# Patient Record
Sex: MALE | Race: WHITE | Employment: STUDENT | URBAN - METROPOLITAN AREA
[De-identification: names, ages, dates, MRNs, and addresses within clinical notes are randomized per-mention and may not be internally consistent; named-entity substitution may affect disease eponyms.]

---
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
## 2022-06-08 ENCOUNTER — APPOINTMENT (OUTPATIENT)
Dept: GENERAL RADIOLOGY | Age: 22
End: 2022-06-08
Payer: COMMERCIAL

## 2022-06-08 ENCOUNTER — HOSPITAL ENCOUNTER (EMERGENCY)
Age: 22
Discharge: HOME | End: 2022-06-08
Attending: STUDENT IN AN ORGANIZED HEALTH CARE EDUCATION/TRAINING PROGRAM
Payer: COMMERCIAL

## 2022-06-08 ENCOUNTER — APPOINTMENT (OUTPATIENT)
Dept: CT IMAGING | Age: 22
End: 2022-06-08
Payer: COMMERCIAL

## 2022-06-08 VITALS
BODY MASS INDEX: 22.9 KG/M2 | WEIGHT: 160 LBS | RESPIRATION RATE: 14 BRPM | TEMPERATURE: 96.8 F | OXYGEN SATURATION: 100 % | DIASTOLIC BLOOD PRESSURE: 66 MMHG | HEART RATE: 62 BPM | SYSTOLIC BLOOD PRESSURE: 128 MMHG | HEIGHT: 70 IN

## 2022-06-08 DIAGNOSIS — R42 ORTHOSTATIC LIGHTHEADEDNESS: Primary | ICD-10-CM

## 2022-06-08 LAB
ALBUMIN SERPL-MCNC: 4.7 G/DL (ref 3.5–5.2)
ALP BLD-CCNC: 82 U/L (ref 40–129)
ALT SERPL-CCNC: 24 U/L (ref 0–40)
ANION GAP SERPL CALCULATED.3IONS-SCNC: 12 MMOL/L (ref 7–16)
AST SERPL-CCNC: 21 U/L (ref 0–39)
BACTERIA: NORMAL /HPF
BASOPHILS ABSOLUTE: 0.01 E9/L (ref 0–0.2)
BASOPHILS RELATIVE PERCENT: 0.2 % (ref 0–2)
BILIRUB SERPL-MCNC: 0.4 MG/DL (ref 0–1.2)
BILIRUBIN URINE: NEGATIVE
BLOOD, URINE: NEGATIVE
BUN BLDV-MCNC: 11 MG/DL (ref 6–20)
CALCIUM SERPL-MCNC: 9.9 MG/DL (ref 8.6–10.2)
CHLORIDE BLD-SCNC: 102 MMOL/L (ref 98–107)
CLARITY: CLEAR
CO2: 24 MMOL/L (ref 22–29)
COLOR: YELLOW
CREAT SERPL-MCNC: 0.8 MG/DL (ref 0.7–1.2)
D DIMER: 239 NG/ML DDU
EKG ATRIAL RATE: 67 BPM
EKG P AXIS: 58 DEGREES
EKG P-R INTERVAL: 150 MS
EKG Q-T INTERVAL: 366 MS
EKG QRS DURATION: 100 MS
EKG QTC CALCULATION (BAZETT): 386 MS
EKG R AXIS: -34 DEGREES
EKG T AXIS: 23 DEGREES
EKG VENTRICULAR RATE: 67 BPM
EOSINOPHILS ABSOLUTE: 0.03 E9/L (ref 0.05–0.5)
EOSINOPHILS RELATIVE PERCENT: 0.6 % (ref 0–6)
GFR AFRICAN AMERICAN: >60
GFR NON-AFRICAN AMERICAN: >60 ML/MIN/1.73
GLUCOSE BLD-MCNC: 96 MG/DL (ref 74–99)
GLUCOSE URINE: NEGATIVE MG/DL
HCT VFR BLD CALC: 45.9 % (ref 37–54)
HEMOGLOBIN: 16.1 G/DL (ref 12.5–16.5)
IMMATURE GRANULOCYTES #: 0.02 E9/L
IMMATURE GRANULOCYTES %: 0.4 % (ref 0–5)
KETONES, URINE: NEGATIVE MG/DL
LEUKOCYTE ESTERASE, URINE: NEGATIVE
LYMPHOCYTES ABSOLUTE: 1.37 E9/L (ref 1.5–4)
LYMPHOCYTES RELATIVE PERCENT: 25.1 % (ref 20–42)
MCH RBC QN AUTO: 30.4 PG (ref 26–35)
MCHC RBC AUTO-ENTMCNC: 35.1 % (ref 32–34.5)
MCV RBC AUTO: 86.8 FL (ref 80–99.9)
MONOCYTES ABSOLUTE: 0.53 E9/L (ref 0.1–0.95)
MONOCYTES RELATIVE PERCENT: 9.7 % (ref 2–12)
NEUTROPHILS ABSOLUTE: 3.49 E9/L (ref 1.8–7.3)
NEUTROPHILS RELATIVE PERCENT: 64 % (ref 43–80)
NITRITE, URINE: NEGATIVE
PDW BLD-RTO: 12.7 FL (ref 11.5–15)
PH UA: 7.5 (ref 5–9)
PLATELET # BLD: 193 E9/L (ref 130–450)
PMV BLD AUTO: 9.9 FL (ref 7–12)
POTASSIUM REFLEX MAGNESIUM: 4.1 MMOL/L (ref 3.5–5)
PROTEIN UA: NEGATIVE MG/DL
RBC # BLD: 5.29 E12/L (ref 3.8–5.8)
RBC UA: NORMAL /HPF (ref 0–2)
SODIUM BLD-SCNC: 138 MMOL/L (ref 132–146)
SPECIFIC GRAVITY UA: 1.01 (ref 1–1.03)
TOTAL PROTEIN: 7.8 G/DL (ref 6.4–8.3)
UROBILINOGEN, URINE: 0.2 E.U./DL
WBC # BLD: 5.5 E9/L (ref 4.5–11.5)
WBC UA: NORMAL /HPF (ref 0–5)

## 2022-06-08 PROCEDURE — 85025 COMPLETE CBC W/AUTO DIFF WBC: CPT

## 2022-06-08 PROCEDURE — 2580000003 HC RX 258: Performed by: STUDENT IN AN ORGANIZED HEALTH CARE EDUCATION/TRAINING PROGRAM

## 2022-06-08 PROCEDURE — 81001 URINALYSIS AUTO W/SCOPE: CPT

## 2022-06-08 PROCEDURE — 85378 FIBRIN DEGRADE SEMIQUANT: CPT

## 2022-06-08 PROCEDURE — 93005 ELECTROCARDIOGRAM TRACING: CPT | Performed by: STUDENT IN AN ORGANIZED HEALTH CARE EDUCATION/TRAINING PROGRAM

## 2022-06-08 PROCEDURE — 80053 COMPREHEN METABOLIC PANEL: CPT

## 2022-06-08 PROCEDURE — 71275 CT ANGIOGRAPHY CHEST: CPT

## 2022-06-08 PROCEDURE — 71046 X-RAY EXAM CHEST 2 VIEWS: CPT

## 2022-06-08 PROCEDURE — 99285 EMERGENCY DEPT VISIT HI MDM: CPT

## 2022-06-08 PROCEDURE — 6360000004 HC RX CONTRAST MEDICATION: Performed by: RADIOLOGY

## 2022-06-08 RX ORDER — 0.9 % SODIUM CHLORIDE 0.9 %
1000 INTRAVENOUS SOLUTION INTRAVENOUS ONCE
Status: COMPLETED | OUTPATIENT
Start: 2022-06-08 | End: 2022-06-08

## 2022-06-08 RX ADMIN — IOPAMIDOL 75 ML: 755 INJECTION, SOLUTION INTRAVENOUS at 16:16

## 2022-06-08 RX ADMIN — SODIUM CHLORIDE 1000 ML: 9 INJECTION, SOLUTION INTRAVENOUS at 14:29

## 2022-06-08 ASSESSMENT — ENCOUNTER SYMPTOMS
SHORTNESS OF BREATH: 0
NAUSEA: 0
ABDOMINAL PAIN: 0
BACK PAIN: 0
SORE THROAT: 0
RHINORRHEA: 0
COUGH: 0
DIARRHEA: 0
VOMITING: 0

## 2022-06-08 ASSESSMENT — PAIN - FUNCTIONAL ASSESSMENT
PAIN_FUNCTIONAL_ASSESSMENT: NONE - DENIES PAIN
PAIN_FUNCTIONAL_ASSESSMENT: NONE - DENIES PAIN

## 2022-06-08 NOTE — ED PROVIDER NOTES
[facility]   Department of Emergency Medicine     Written by: Susana Junior DO  Patient Name: Ami Fabian  Attending Provider: Agnie Austin DO  Admit Date: 2022 12:40 PM  MRN: [id number]    : 2000        Chief Complaint   Patient presents with   [place] Other     \"whenever I sit still, I feel like I'm going to faint\" since yesterday, sweating x2 weeks    - Chief complaint    HPI   Ami Fabian is a 25 y.o. male presenting to the ED for evaluation of Other (\"whenever I sit still, I feel like I'm going to faint\" since yesterday, sweating x2 weeks)    Patient is a 26 y/o male with pmhx of ADHD, autism spectrum disorder, and OCD, presenting for evaluation of feeling like he is going to pass out, as well as diaphoresis for the past two weeks. States that when he has blood drawn in the past he feels this same sensation; no recent blood work or doctor visits. States while playing video games yesterday, seated, he suddenly had the sensation of lightheadedness like he might pass out. It lasted a few seconds. Then later while laying down he had the feeling again and states he is currently feeling it now. Also states he feels like sometime he realizes he forgot to breath and has to think about breathing normally. Denies SOB, CP, or palpitations. Denies any recent illnesses, fevers, N/V/D, abdominal pain, abnormal urinary symptoms, rashes anywhere, joint pains, confusion or syncopal event. Denies any motor or sensory deficits, headaches or visual changes. He did not fall or injure himself. Denies AH/VH, SI/HI. Denies any recent medication changes. Review of Systems   Constitutional: Positive for diaphoresis. Negative for chills and fever. HENT: Negative for rhinorrhea and sore throat. Eyes: Negative for visual disturbance. Respiratory: Negative for cough and shortness of breath. Cardiovascular: Negative for chest pain and palpitations.    Gastrointestinal: Negative for abdominal pain, diarrhea, nausea and vomiting. Genitourinary: Negative for dysuria and frequency. Musculoskeletal: Negative for back pain and myalgias. Skin: Negative for rash and wound. Neurological: Positive for light-headedness. Negative for weakness and headaches. Psychiatric/Behavioral: Negative for confusion. All other systems reviewed and are negative. Physical Exam  Vitals and nursing note reviewed. Constitutional:       General: He is not in acute distress. Appearance: He is not toxic-appearing. HENT:      Head: Normocephalic and atraumatic. Right Ear: External ear normal.      Left Ear: External ear normal.      Nose: Nose normal. No rhinorrhea. Mouth/Throat:      Mouth: Mucous membranes are moist.      Pharynx: Oropharynx is clear. Eyes:      Extraocular Movements: Extraocular movements intact. Conjunctiva/sclera: Conjunctivae normal.      Pupils: Pupils are equal, round, and reactive to light. Cardiovascular:      Rate and Rhythm: Normal rate and regular rhythm. Pulses: Normal pulses. Heart sounds: Normal heart sounds. No murmur heard. Pulmonary:      Effort: Pulmonary effort is normal. No respiratory distress. Breath sounds: Normal breath sounds. No wheezing or rales. Abdominal:      General: Bowel sounds are normal.      Palpations: Abdomen is soft. Tenderness: There is no abdominal tenderness. There is no guarding or rebound. Musculoskeletal:         General: No tenderness. Normal range of motion. Cervical back: Normal range of motion and neck supple. No rigidity or tenderness. Right lower leg: No edema. Left lower leg: No edema. Skin:     General: Skin is warm and dry. Capillary Refill: Capillary refill takes less than 2 seconds. Coloration: Skin is not jaundiced or pale. Findings: No rash. Neurological:      General: No focal deficit present.       Mental Status: He is alert and oriented to person, place, and time. Sensory: No sensory deficit. Motor: No weakness. Psychiatric:         Mood and Affect: Mood normal.         Behavior: Behavior normal.          Procedures       [facility]    This is a 25 y.o. male presenting for evaluation of vague symptoms of lightheadedness and feeling like he might pass out; please see HPI for further details. On arrival patient is alert, oriented, no acute distress; he is nontoxic in appearance. Vitals are stable; orthostatic vital signs are notable for increase in heart rate from 60 to 88 bpm from sitting to standing. Exam is overall generally unremarkable; lungs CTA bilaterally, no focal neurologic deficits noted, heart RRR, no murmurs, abdomen soft and nontender. Labs and imaging ordered. Patient seen and examined. Labs reviewed, we did obtain a D-dimer given that patient was having vague symptoms of near syncope and he did have COVID around [place] time; D-dimer was 239; follow-up CTA did not show any acute abnormalities and no evidence for acute pulmonary embolism. Patient was treated with IV fluid bolus with improvement of symptoms. Suspect possibly orthostatic lightheadedness. Feel he is stable and appropriate for discharge. He ambulated in the part without issue. Results and plan discussed with the patient and his mother, they voiced understanding and are amenable. Return precautions were discussed. EKG reviewed and interpreted by me:  Normal sinus rhythm, no ST elevations; LAD, no previous EKG available for comparison. Rate 67, , , QTc 386. I have discussed this patient with my attending, who has seen the patient and agrees with this disposition. Patient was seen and evaluated by myself and my attending Robert Buckley DO.  Assessment and Plan discussed with attending provider, please see attestation for final plan of care.       --------------------------------------------- PAST HISTORY ---------------------------------------------  Past Medical History:  has a past medical history of ADHD (attention deficit hyperactivity disorder), Autistic spectrum disorder, Depression, and OCD (obsessive compulsive disorder). Past Surgical History:  has no past surgical history on file. Social History:  reports that he has never smoked. He has never used smokeless tobacco. He reports that he does not drink alcohol and does not use drugs. Family History: family history includes No Known Problems in his father and mother; Other in his brother. The patients home medications have been reviewed.     Allergies: Seasonal    -------------------------------------------------- RESULTS -------------------------------------------------  Labs:  Results for orders placed or performed during the hospital encounter of 06/08/22   CBC with Auto Differential   Result Value Ref Range    WBC 5.5 4.5 - 11.5 E9/L    RBC 5.29 3.80 - 5.80 E12/L    Hemoglobin 16.1 12.5 - 16.5 g/dL    Hematocrit 45.9 37.0 - 54.0 %    MCV 86.8 80.0 - 99.9 fL    MCH 30.4 26.0 - 35.0 pg    MCHC 35.1 (H) 32.0 - 34.5 %    RDW 12.7 11.5 - 15.0 fL    Platelets 873 460 - 361 E9/L    MPV 9.9 7.0 - 12.0 fL    Neutrophils % 64.0 43.0 - 80.0 %    Immature Granulocytes % 0.4 0.0 - 5.0 %    Lymphocytes % 25.1 20.0 - 42.0 %    Monocytes % 9.7 2.0 - 12.0 %    Eosinophils % 0.6 0.0 - 6.0 %    Basophils % 0.2 0.0 - 2.0 %    Neutrophils Absolute 3.49 1.80 - 7.30 E9/L    Immature Granulocytes # 0.02 E9/L    Lymphocytes Absolute 1.37 (L) 1.50 - 4.00 E9/L    Monocytes Absolute 0.53 0.10 - 0.95 E9/L    Eosinophils Absolute 0.03 (L) 0.05 - 0.50 E9/L    Basophils Absolute 0.01 0.00 - 0.20 E9/L   Comprehensive Metabolic Panel w/ Reflex to MG   Result Value Ref Range    Sodium 138 132 - 146 mmol/L    Potassium reflex Magnesium 4.1 3.5 - 5.0 mmol/L    Chloride 102 98 - 107 mmol/L    CO2 24 22 - 29 mmol/L    Anion Gap 12 7 - 16 mmol/L    Glucose 96 74 - 99 mg/dL    BUN 11 6 - 20 mg/dL    CREATININE 0.8 0.7 - 1.2 mg/dL    GFR Non-African American >60 >=60 mL/min/1.73    GFR African American >60     Calcium 9.9 8.6 - 10.2 mg/dL    Total Protein 7.8 6.4 - 8.3 g/dL    Albumin 4.7 3.5 - 5.2 g/dL    Total Bilirubin 0.4 0.0 - 1.2 mg/dL    Alkaline Phosphatase 82 40 - 129 U/L    ALT 24 0 - 40 U/L    AST 21 0 - 39 U/L   Urinalysis with Microscopic   Result Value Ref Range    Color, UA Yellow Straw/Yellow    Clarity, UA Clear Clear    Glucose, Ur Negative Negative mg/dL    Bilirubin Urine Negative Negative    Ketones, Urine Negative Negative mg/dL    Specific Gravity, UA 1.010 1.005 - 1.030    Blood, Urine Negative Negative    pH, UA 7.5 5.0 - 9.0    Protein, UA Negative Negative mg/dL    Urobilinogen, Urine 0.2 <2.0 E.U./dL    Nitrite, Urine Negative Negative    Leukocyte Esterase, Urine Negative Negative    WBC, UA NONE 0 - 5 /HPF    RBC, UA NONE 0 - 2 /HPF    Bacteria, UA NONE SEEN None Seen /HPF   D-Dimer, Quantitative   Result Value Ref Range    D-Dimer, Quant 239 ng/mL DDU   EKG 12 Lead   Result Value Ref Range    Ventricular Rate 67 BPM    Atrial Rate 67 BPM    P-R Interval 150 ms    QRS Duration 100 ms    Q-T Interval 366 ms    QTc Calculation (Bazett) 386 ms    P Axis 58 degrees    R Axis -34 degrees    T Axis 23 degrees       Radiology:  CTA PULMONARY W CONTRAST   Final Result   No evidence of pulmonary embolism or acute pulmonary abnormality. XR CHEST (2 VW)   Final Result   No acute process. ------------------------- NURSING NOTES AND VITALS REVIEWED ---------------------------  Date / Time Roomed:  6/8/2022 12:40 PM  ED Bed Assignment:  07/07    The nursing notes within the ED encounter and vital signs as below have been reviewed.    /66   Pulse 62   Temp 96.8 °F (36 °C) (Temporal)   Resp 14   Ht 5' 10\" (1.778 m)   Wt 160 lb (72.6 kg)   SpO2 100%   BMI 22.96 kg/m²   Oxygen Saturation Interpretation: Normal      ------------------------------------------ PROGRESS NOTES ------------------------------------------  5:11 PM EDT  I have spoken with the patient and discussed todays results, in addition to providing specific details for the plan of care and counseling regarding the diagnosis and prognosis. Their questions are answered at this time and they are agreeable with the plan. I discussed at length with them reasons for immediate return here for re evaluation. They will followup with their primary care physician by calling their office tomorrow. --------------------------------- ADDITIONAL PROVIDER NOTES ---------------------------------  At this time the patient is without objective evidence of an acute process requiring hospitalization or inpatient management. They have remained hemodynamically stable throughout their entire ED visit and are stable for discharge with outpatient follow-up. The plan has been discussed in detail and they are aware of the specific conditions for emergent return, as well as the importance of follow-up. Discharge Medication List as of 6/8/2022  5:02 PM          Diagnosis:  1. Orthostatic lightheadedness        Disposition:  Patient's disposition: Discharge to home  Patient's condition is stable.        Anny Fair,   Resident  06/08/22 0504

## 2022-06-13 ENCOUNTER — OFFICE VISIT (OUTPATIENT)
Dept: PRIMARY CARE CLINIC | Age: 22
End: 2022-06-13
Payer: COMMERCIAL

## 2022-06-13 VITALS
OXYGEN SATURATION: 98 % | WEIGHT: 164 LBS | DIASTOLIC BLOOD PRESSURE: 62 MMHG | SYSTOLIC BLOOD PRESSURE: 118 MMHG | TEMPERATURE: 97.8 F | HEART RATE: 97 BPM | BODY MASS INDEX: 23.53 KG/M2

## 2022-06-13 DIAGNOSIS — R55 NEAR SYNCOPE: Primary | ICD-10-CM

## 2022-06-13 DIAGNOSIS — F84.0 AUTISTIC SPECTRUM DISORDER: ICD-10-CM

## 2022-06-13 DIAGNOSIS — L70.0 ACNE VULGARIS: ICD-10-CM

## 2022-06-13 DIAGNOSIS — U09.9 POST-COVID SYNDROME: ICD-10-CM

## 2022-06-13 DIAGNOSIS — R53.83 OTHER FATIGUE: ICD-10-CM

## 2022-06-13 DIAGNOSIS — R68.89 HEAT INTOLERANCE: ICD-10-CM

## 2022-06-13 DIAGNOSIS — J02.9 ACUTE PHARYNGITIS, UNSPECIFIED ETIOLOGY: ICD-10-CM

## 2022-06-13 PROCEDURE — 99215 OFFICE O/P EST HI 40 MIN: CPT | Performed by: FAMILY MEDICINE

## 2022-06-13 RX ORDER — ISOTRETINOIN 20 MG/1
CAPSULE ORAL
COMMUNITY
Start: 2022-05-25

## 2022-06-13 RX ORDER — CEFDINIR 300 MG/1
300 CAPSULE ORAL 2 TIMES DAILY
Qty: 20 CAPSULE | Refills: 0 | Status: SHIPPED | OUTPATIENT
Start: 2022-06-13 | End: 2022-06-23

## 2022-06-13 SDOH — ECONOMIC STABILITY: FOOD INSECURITY: WITHIN THE PAST 12 MONTHS, THE FOOD YOU BOUGHT JUST DIDN'T LAST AND YOU DIDN'T HAVE MONEY TO GET MORE.: NEVER TRUE

## 2022-06-13 SDOH — ECONOMIC STABILITY: FOOD INSECURITY: WITHIN THE PAST 12 MONTHS, YOU WORRIED THAT YOUR FOOD WOULD RUN OUT BEFORE YOU GOT MONEY TO BUY MORE.: NEVER TRUE

## 2022-06-13 ASSESSMENT — PATIENT HEALTH QUESTIONNAIRE - PHQ9
SUM OF ALL RESPONSES TO PHQ QUESTIONS 1-9: 0
2. FEELING DOWN, DEPRESSED OR HOPELESS: 0
SUM OF ALL RESPONSES TO PHQ QUESTIONS 1-9: 0
1. LITTLE INTEREST OR PLEASURE IN DOING THINGS: 0
SUM OF ALL RESPONSES TO PHQ QUESTIONS 1-9: 0
SUM OF ALL RESPONSES TO PHQ QUESTIONS 1-9: 0
SUM OF ALL RESPONSES TO PHQ9 QUESTIONS 1 & 2: 0

## 2022-06-13 ASSESSMENT — SOCIAL DETERMINANTS OF HEALTH (SDOH): HOW HARD IS IT FOR YOU TO PAY FOR THE VERY BASICS LIKE FOOD, HOUSING, MEDICAL CARE, AND HEATING?: NOT HARD AT ALL

## 2022-06-13 NOTE — PROGRESS NOTES
diaphoresis,  edema,orthopnea,  palpitations, syncope and near syncopal episode or any exertional symptoms  Resp: Denies cough, hemoptysis, pleuritic pain, SOB, sputum production and wheezing. GI: Denies abdominal pain, change in bowel habits, hematochezia, melena, nausea and vomiting. : Denies urinary symptoms including dysuria , urgency, frequency or hematuria. Musculo: Denies musculoskeletal symptoms. Skin: Denies bruising and rash. Neuro: Denies headache, numbness, stiff neck, tingling and focal weakness slurred speech or facial  droop  Hema/Lymph: Denies bleeding/bruising tendency and enlarged lymph nodes        Current Outpatient Medications:     AMNESTEEM 20 MG chemo capsule, TAKE 1 CAPSULE BY MOUTH TWICE DAILY, Disp: , Rfl:     cefdinir (OMNICEF) 300 MG capsule, Take 1 capsule by mouth 2 times daily for 10 days, Disp: 20 capsule, Rfl: 0    risperiDONE (RISPERDAL) 1 MG tablet, Take 1 tablet by mouth 2 times daily, Disp: , Rfl:     methylphenidate (CONCERTA) 27 MG extended release tablet, Take 27 mg by mouth every morning. Neurology managin, Disp: , Rfl:   Allergies   Allergen Reactions    Seasonal Other (See Comments)     Itchy watery eyes and sneezing       Past Medical History:   Diagnosis Date    ADHD (attention deficit hyperactivity disorder)     Autistic spectrum disorder     Depression     OCD (obsessive compulsive disorder)     Tics     No past surgical history on file.   Family History   Problem Relation Age of Onset    No Known Problems Mother     No Known Problems Father     Other Brother         Burkitt's Lymphoma     Social History     Tobacco Use    Smoking status: Never Smoker    Smokeless tobacco: Never Used   Substance Use Topics    Alcohol use: No    Drug use: No      Social History     Social History Narrative    PMH:    Health Maintenance:    Gardasil - (1/4/2017)    Gardasil - (5/3/2017)    Influenza Vaccination - (11/7/2017)    Medical Problems:    Attention Deficit Hyperactivity Disorder - Autistic Spectrum Disorder. Dr Vernon Samuels psychologist; Kitty Roa    Daniel    Depression    OCD - Tics    Allergies - gets allergy shot. Surgical Hx:    Denies    Reviewed and updated. FH:    Father:    . (Hx)    Mother:    . (Hx)    Denies CM, sudden death, congenital or otherwise    Reviewed, no changes. SH:    . Ely Owusu with Mom (counselor) and Step Dad (dentist); No smokers at home    Plays piano (likes) and previous violin (did not like), guitar, ukalalayli,  Sax     Personal Habits: Cigarette Use: Never Smoked Cigarettes. . (Alcohol)    Reviewed, no changes        Vitals:    06/13/22 1510   BP: 118/62   Pulse: 97   Temp: 97.8 °F (36.6 °C)   SpO2: 98%   Weight: 164 lb (74.4 kg)      Wt Readings from Last 3 Encounters:   06/13/22 164 lb (74.4 kg) (64 %, Z= 0.37)*   06/08/22 160 lb (72.6 kg) (59 %, Z= 0.23)*   11/17/20 166 lb (75.3 kg) (75 %, Z= 0.67)*     * Growth percentiles are based on CDC (Boys, 2-20 Years) data. Physical Exam  Exam:  Const: Appears comfortable. No signs of acute distress present. Head/Face: Atraumatic on inspection. Eyes: EOMI in both eyes. No discharge from the eyes. PERRL. Sclerae clear. ENMT: Auditory canals normal. Tympanic membranes: intact and translucent. External nose WNL. Nasal mucosa is clear. Oropharynx: Slightly erythematous with mild exudate  Neck: Supple. Palpation reveals no adenopathy. No masses appreciated. No JVD. Carotids: no  bruits. Resp: Respirations are unlabored. Clear to auscultation. No rales, rhonchi or wheezes appreciated  over the lungs bilaterally. CV: Rate is regular. Rhythm is regular. No gallop or rubs. No heart murmur appreciated. Extremities: No clubbing, cyanosis, or edema. No calf inflammation or tenderness. Abdomen: Bowel sounds are normoactive. Abdomen is soft, nontender, and nondistended. No  abdominal masses. No palpable hepatosplenomegaly.   Lymph: No palpable or visible regional lymphadenopathy. Musculoskeletal: no acute joint inflammation. Skin: Dry and warm with no rash. Skin normal to inspection and palpation overall. Neuro: Alert and oriented. Affect: appropriate. Upper Extremities: 5/5 bilaterally. Lower Extremities:  5/5 bilaterally. Sensation intact to light touch. Reflexes: DTR's are symmetric and 2+ bilaterally. .  Cranial Nerves: Cranial nerves grossly intact. Office Labs This Visit :  No results found for this visit on 06/13/22. Assessment and Plan:   Diagnosis Orders   1. Near syncope  TSH    T4, Free    Amb External Referral To Cardiology    Carol Murel Green Virus (EBV) Antibody Panel I   2. Acute pharyngitis, unspecified etiology  cefdinir (OMNICEF) 300 MG capsule    ANTISTREPTOLYSIN O TITER   3. Other fatigue  TSH    T4, Free    Carol Barr Virus (EBV) Antibody Panel I   4. Heat intolerance  TSH    T4, Free   5. Post-COVID syndrome     6. Autistic spectrum disorder     7. Acne vulgaris         No problem-specific Assessment & Plan notes found for this encounter. 1. Acute pharyngitis, unspecified etiology  Counseled extensively. Differential reviewed, including serious etiologies. Rule out strep/mono. Check EBV/ASO. Empirically with cefdinir with precautions including C. difficile, risk benefits probiotic reviewed. Consider PANDA  - cefdinir (OMNICEF) 300 MG capsule; Take 1 capsule by mouth 2 times daily for 10 days  Dispense: 20 capsule; Refill: 0  - ANTISTREPTOLYSIN O TITER; Future    2. Near syncope  Counseled extensively. Differential reviewed, including serious etiologies. Interestingly only with lying but yet feels lightheaded not vertiginous. Anxiety/OCD seems to be contributing. However vast array of post-COVID complications reviewed. CTA chest negative, blood work overall negative. Check TFTs. Check ASO/EBV. Refer to cardiology. Role of echo event monitor and stress test reviewed. He is prone to vasovagal syncope.   However not consistent with this at this point. Counseled on proper hydration and salt however. - TSH; Future  - T4, Free; Future  - Amb External Referral To Cardiology  - Satnam Rhymes Virus (EBV) Antibody Panel I; Future    3. Other fatigue  Counseled extensively. Differential reviewed, including serious etiologies. - TSH; Future  - T4, Free; Future  - Carol Barr Virus (EBV) Antibody Panel I; Future    4. Heat intolerance  Counseled extensively. Differential reviewed, including serious etiologies. - TSH; Future  - T4, Free; Future    5. Post-COVID syndrome  Many symptoms of occurred after COVID April 2022. Previously had Moderna x3. Counseled on potential thyroiditis, cardiac complication etc.  However I do feel anxiety/OCD contributing      6. Autistic spectrum disorder  With Asperger syndrome. Follows with [facility]. No recent change in meds prescribed by them including Concerta and Risperdal.  Risk-benefit reviewed. Side effects ADRs reviewed. They are to discuss with Elba Begum and counselor regarding possibility that his ASD, anxiety with OCD tendencies could be contributing. 7. Acne vulgaris  Started on Accutane about 5 months ago. Could be contributing to symptoms. They will discuss with Dr. Elissa Villareal possibility of discontinuing        No flowsheet data found. Plan as above. Counseled extensively and differential diagnoses relevant to above were reviewed, including serious etiologies, risks and complications, especially of left uncontrolled. If relevant, instructions and  alternatives to meds/treatment reviewed, as well as interactions, and  SE's/ADRs reviewed, notify immediately if any, discontinuing new meds if any. Plan made after discussion and shared decision making. Check TFTs EBV and ASO. Mono precautions reviewed. Proper hydration and salt. Falls precautions. Syncope precautions. Start cefdinir. Refer to cardiology. They will discuss with Dr. Elissa Villareal regarding stopping Accutane. Speak with psychology/psychiatry regarding these diagnoses and possibility of anxiety/OCD contributing. Notations to outpatient valuation reviewed. Role of hospitalization reviewed. If symptoms should persist or worsen I did recommend he go to [facility] as opposed to [facility] if they will accept his age. At this point they are comfortable with the above plan and will proceed as above. As long as symptoms steadily improve/resolve they are going to follow-up in 1 to 2 weeks sooner as needed. Again falls precautions reviewed etc.  Watch closely and if any concern directly to hospital.  Risk of sudden events reviewed        As long as symptoms steadily improve/resolve, and medical conditions follow the expected course, FU as below, sooner PRN. Return for 2wks, sooner prn. Educational materials and/or home exercises printed for patient's review and were included in patient instructions on his/her After Visit Summary and given to patient at the end of visit. After discussion, patient and/or guardian verbalizes understanding, agrees, feels comfortable with and wishes to proceed with above treatment plan. Call for any pending results, FU sooner if abnormal, as needed or if any current symptoms persist/worsen. Advised patient to call with any new medication issues, and read all Rx info from pharmacy to assure aware of all possible risks and side effects of medication before taking. Reviewed age and gender appropriate health screening exams and vaccinations. Advised patient regarding importance of keeping up with recommended health maintenance and to schedule as soon as possible if overdue, as this is important in assessing for undiagnosed pathology, especially cancer, as well as protecting against potentially harmful/life threatening disease. Patient and/or guardian verbalizes understanding and agrees with above counseling, assessment and plan.      All questions answered. Despite discussion, not interested in returning to emergency room or hospitalization now. However if symptoms persist or worsen, or other serious signs or sympoms, they are to  go to ER immediately. Over 40 minutes  spent with the patient in reviewing records, reviewing with patient/family, counseling, ordering,  prescribing, completing h&p, etc., with over 50% of the time spent face to face counseling. Varsha Bowling MD    Patients are advised to check with insurance company to ensure coverage and to fully understand benefits and cost prior to any testing. This note was created with the assistance of voice recognition software. Document was reviewed however may contain grammatical errors.

## 2022-06-14 ENCOUNTER — HOSPITAL ENCOUNTER (OUTPATIENT)
Age: 22
Discharge: HOME | End: 2022-06-14
Payer: COMMERCIAL

## 2022-06-14 DIAGNOSIS — J02.9 ACUTE PHARYNGITIS, UNSPECIFIED ETIOLOGY: ICD-10-CM

## 2022-06-14 DIAGNOSIS — R68.89 HEAT INTOLERANCE: ICD-10-CM

## 2022-06-14 DIAGNOSIS — R53.83 OTHER FATIGUE: ICD-10-CM

## 2022-06-14 DIAGNOSIS — R55 NEAR SYNCOPE: ICD-10-CM

## 2022-06-14 LAB
ANTISTREPTOLYSIN-O: 187 IU/ML (ref 0–200)
T4 FREE: 1.08 NG/DL (ref 0.93–1.7)
TSH SERPL DL<=0.05 MIU/L-ACNC: 1.44 UIU/ML (ref 0.27–4.2)

## 2022-06-14 PROCEDURE — 36415 COLL VENOUS BLD VENIPUNCTURE: CPT

## 2022-06-14 PROCEDURE — 86663 EPSTEIN-BARR ANTIBODY: CPT

## 2022-06-14 PROCEDURE — 86060 ANTISTREPTOLYSIN O TITER: CPT

## 2022-06-14 PROCEDURE — 86664 EPSTEIN-BARR NUCLEAR ANTIGEN: CPT

## 2022-06-14 PROCEDURE — 84439 ASSAY OF FREE THYROXINE: CPT

## 2022-06-14 PROCEDURE — 86665 EPSTEIN-BARR CAPSID VCA: CPT

## 2022-06-14 PROCEDURE — 84443 ASSAY THYROID STIM HORMONE: CPT

## 2022-06-18 LAB
EPSTEIN BARR VIRUS NUCLEAR AB IGG: <3 U/ML (ref 0–21.9)
EPSTEIN-BARR EARLY ANTIGEN ANTIBODY: <5 U/ML (ref 0–10.9)
EPSTEIN-BARR VCA IGG: <10 U/ML (ref 0–21.9)
EPSTEIN-BARR VCA IGM: <10 U/ML (ref 0–43.9)

## 2022-07-06 ENCOUNTER — OFFICE VISIT (OUTPATIENT)
Dept: PRIMARY CARE CLINIC | Age: 22
End: 2022-07-06
Payer: COMMERCIAL

## 2022-07-06 VITALS
SYSTOLIC BLOOD PRESSURE: 122 MMHG | BODY MASS INDEX: 23.82 KG/M2 | OXYGEN SATURATION: 97 % | WEIGHT: 166 LBS | DIASTOLIC BLOOD PRESSURE: 60 MMHG | TEMPERATURE: 97.3 F | HEART RATE: 92 BPM

## 2022-07-06 DIAGNOSIS — L03.115 CELLULITIS OF RIGHT FOOT: ICD-10-CM

## 2022-07-06 DIAGNOSIS — Z00.121 ENCOUNTER FOR ROUTINE CHILD HEALTH EXAMINATION WITH ABNORMAL FINDINGS: Primary | ICD-10-CM

## 2022-07-06 DIAGNOSIS — L70.0 ACNE VULGARIS: ICD-10-CM

## 2022-07-06 DIAGNOSIS — R55 NEAR SYNCOPE: ICD-10-CM

## 2022-07-06 DIAGNOSIS — U09.9 POST-COVID SYNDROME: ICD-10-CM

## 2022-07-06 DIAGNOSIS — F84.0 AUTISTIC SPECTRUM DISORDER: ICD-10-CM

## 2022-07-06 PROCEDURE — 99395 PREV VISIT EST AGE 18-39: CPT | Performed by: FAMILY MEDICINE

## 2022-07-06 PROCEDURE — 90715 TDAP VACCINE 7 YRS/> IM: CPT | Performed by: FAMILY MEDICINE

## 2022-07-06 PROCEDURE — 90471 IMMUNIZATION ADMIN: CPT | Performed by: FAMILY MEDICINE

## 2022-07-06 PROCEDURE — 90620 MENB-4C VACCINE IM: CPT | Performed by: FAMILY MEDICINE

## 2022-07-06 PROCEDURE — 99214 OFFICE O/P EST MOD 30 MIN: CPT | Performed by: FAMILY MEDICINE

## 2022-07-06 NOTE — PROGRESS NOTES
Pippa Camacho : 2000 Sex: male  Age: 25 y.o. WELL CHILD VISIT  FOR ADOLESCENT     CONCERNS: Patient presents today for wellness exam as well as other issues as below. Continues to feel intermittently near syncopal, usually with sitting. He saw Dr. Ana Costa and had a negative exam/EKG and she felt vasovagal near syncope and recommended at least 2 L of fluid a day and appropriate sodium. We reiterated this. However it is concerning that it occurs mostly with sitting. No actual syncope. No associated headache double vision numbness tingling focal weakness slurred speech facial droop chest pain palpitations shortness of breath cough hemoptysis abdominal pain nausea vomiting change in bowels numbness tingling focal weakness slurred speech or facial droop. He feels \"19% of it is associated with thinking about it\". History of Asperger's. He is following with Lydia Aaron. He does not feel medicines are contributing as he has been doing well with these medicines. Admits to underlying anxiety. Has smoked street marijuana handful times, recommended against this. Also vapes occasionally recommended against this. Apsley no SI/HI. Also about a week ago accidentally kicked a dead fish/fish bones at [facility] on an [address], had immediate pain and swelling-went to an urgent care and was given 2 weeks of Keflex, he is 80% better. No fever chills. Dr. Pj Gleason consultation reviewed, follow-up blood work that we ordered including TFTs EBV titers and ASO negative. In the ER he had other blood work that was reviewed as well as CTA of the chest which was reviewed. Visit to dentist past year   Yes   School work average or above  Yes  Vision testing past year  encouraged yearly  Sexually active or informed    No counseled.    Drug abuse exposure at home or school: No    Past Medical History:   Diagnosis Date    ADHD (attention deficit hyperactivity disorder)     Autistic spectrum disorder     Depression     OCD (obsessive compulsive disorder)     Tics      No past surgical history on file. Family History   Problem Relation Age of Onset    No Known Problems Mother     No Known Problems Father     Other Brother         Burkitt's Lymphoma     Social History     Tobacco Use    Smoking status: Never Smoker    Smokeless tobacco: Never Used   Substance Use Topics    Alcohol use: No    Drug use: No     Social History     Social History Narrative    PMH:    Health Maintenance:    Gardasil - (1/4/2017)    Gardasil - (5/3/2017)    Influenza Vaccination - (11/7/2017)    Medical Problems:    Attention Deficit Hyperactivity Disorder - Autistic Spectrum Disorder. Dr Claudell Junk psychologist; Juan Shetty    Daniel    Depression    OCD - Tics    Allergies - gets allergy shot. Surgical Hx:    Denies    Reviewed and updated. FH:    Father:    . (Hx)    Mother:    . (Hx)    Denies CM, sudden death, congenital or otherwise    Reviewed, no changes. SH:    . Celia Sands with Mom (counselor) and Step Dad (dentist); No smokers at home    Plays piano (likes) and previous violin (did not like), adrian, suzanne,  Stacey Diss     Going to [employer], studying Biology (interested in field biology)    Personal Habits: Cigarette Use: Never Smoked Cigarettes. . (Alcohol)    Reviewed, no changes           Current Outpatient Medications:     Cephalexin (KEFLEX PO), Take by mouth, Disp: , Rfl:     AMNESTEEM 20 MG chemo capsule, TAKE 1 CAPSULE BY MOUTH TWICE DAILY, Disp: , Rfl:     risperiDONE (RISPERDAL) 1 MG tablet, Take 1 tablet by mouth 2 times daily, Disp: , Rfl:     methylphenidate (CONCERTA) 27 MG extended release tablet, Take 27 mg by mouth every morning.  Neurology managin, Disp: , Rfl:   Allergies   Allergen Reactions    Seasonal Other (See Comments)     Itchy watery eyes and sneezing       Vitals:    07/06/22 1109   BP: 122/60   Pulse: 92   Temp: 97.3 °F (36.3 °C)   SpO2: 97% Weight: 166 lb (75.3 kg)     Wt Readings from Last 3 Encounters:   07/06/22 166 lb (75.3 kg) (67 %, Z= 0.43)*   06/13/22 164 lb (74.4 kg) (64 %, Z= 0.37)*   06/08/22 160 lb (72.6 kg) (59 %, Z= 0.23)*     * Growth percentiles are based on [facility] (Boys, 2-20 Years) data. exam:  Skin  Normal: No lesions; except mild nonfluctuant swelling/inflammation right foot MTP joint, puncture wounds closed, nontender, not red or hot  Head  Normal: AT/NC  Eyes  Normal: PERRLA/EOMI  Ears  Normal: Clear  Nose  Normal: Clear  Mouth  Normal: Clear  Neck/Thyroid Normal: Supple. No LAD. No masses. Lungs  Normal: CTAB  Heart  Normal: RRR w/o murmur. Negative orthostatics  Abdomen Normal: Soft NT/ND No HSM No masses  Pos BS  Extremities/Spine Normal: FROM. No abnormal curvature. Full pulses. Neurological Normal: Intact without focal deficit    Genitalia:  Alfred Stage: 5 Pubic Hair:  5 Penis:  CircumTestes: Genitalia NL male, testes descended without nodularity or hernia with and without valsalva. Breasts: Symmetric    PREVENTATIVE EDUCATION  Healthy diet reviewed        Yes  Regular physical activity recommended        Yes  Bicycle helmets recommended  Yes  Seatbels use recommended   Yes  Reviewed discipline    Yes  TV rules and limits recommended  Yes  Fighting/conflict resolution reviewed  Yes  Sex education reviewed (condom use) Yes  Self breast/testicular exam reviewed  Yes  Previous reaction to immunizations  No  Side effects of vaccines discussed  Yes  Allergy to eggs                                               No      Immunizations:   Tdap 8/15; agrees to booster       Meningococcal x2     Varicella had x 2       Gardasil   had x 3                             HEP A had x 2                        FLU seasonal  MEN B - agrees  COVID x 3      PDMP Monitoring:    Last PDMP [place] as Reviewed [facility]):  Review User Review Instant Review Result   JOSE ANTONIO JOHNSON 7/6/2022 12:42 PM Reviewed PDMP [1] Last Controlled Substance Monitoring Documentation      Office Visit from 7/6/2022 in [facility]   Periodic Controlled Substance Monitoring Possible medication side effects, risk of tolerance/dependence & alternative treatments discussed., No signs of potential drug abuse or diversion identified. , Assessed functional status. filed at 07/06/2022 1242        Urine Drug Screenings (1 yr)     Urine Drug Screen  Collected: 9/27/2019  4:21 PM (Final result)    Complete Results              Medication Contract and Consent for Opioid Use Documents Filed      No documents found                ASSESSMENT and PLAN   Diagnosis Orders   1. Encounter for routine child health examination with abnormal findings  Meningococcal Jessica Golden, (age 8y-22y), IM   2. Near syncope     3. Autistic spectrum disorder     4. Cellulitis of right foot  ALFREDO Borrero, (age 12y-63y), IM   5. Post-COVID syndrome     6. Acne vulgaris         Well Child  Health maintenance issues discussed at length as above, 7/6/2022 . Encouraged yearly physicals. Near syncope  Counseled extensively. Differential reviewed, including serious etiologies. Interestingly only with lying/sitting but yet feels lightheaded not vertiginous. Anxiety/OCD seems to be contributing. However vast array of post-COVID complications reviewed. CTA chest negative, blood work overall negative. He did see cardiology who felt this was vasovagal near syncope and recommended at least 2 L of fluid daily and appropriate sodium. They did not do echo. Role of echo event monitor and stress test reviewed. He is prone to vasovagal syncope. However not entirely consistent with this at this point. With having persistent symptoms I did recommend at least following up with Dr. Darien Arita with consideration for echo. No other neurologic symptoms. Also follow-up with Nichol  to make sure no OCD component. Arnulfo did not feel meds are contributing.   Mom does feel things are significantly better. They want to monitor at this point. In this regard conservative measures were reviewed     Post-COVID syndrome  Many symptoms of occurred after COVID April 2022. Previously had Moderna x3. Counseled on potential thyroiditis, cardiac complication etc.  However I do feel anxiety/OCD contributing         Autistic spectrum disorder  With Asperger syndrome. Follows with [facility]. No recent change in meds prescribed by them including Concerta and Risperdal.  Risk-benefit reviewed. Side effects ADRs reviewed. They are to discuss with Bee Norman and counselor regarding possibility that his ASD, anxiety with OCD tendencies could be contributing. Acne vulgaris  Started on Accutane about 5 months ago. Could be contributing to symptoms. They will discuss with Dr. Bruce Melendez possibility of discontinuing     Cellulitis right foot  After puncture from fish carcass bones on [place] of leg area. He is currently day 7 of 14 of cephalexin 500 4 times a day, risk-benefit reviewed including C. difficile, coverage reviewed, discussed dual antibiotic therapy, topicals, probiotic. He is at least 80% better. Therefore we will stay the course and monitor. If symptoms do not further improve every 2 to 3 days and resolve in the next week or worsen anyway he is to follow-up immediately, ER if any serious signs or symptoms. Wound care reviewed. No problem-specific Assessment & Plan notes found for this encounter. Plan as above. Counseled extensively and differential diagnoses relevant to above were reviewed, including serious etiologies, risks and complications, especially of left uncontrolled. If relevant, instructions and  alternatives to meds/treatment reviewed, as well as interactions, and  SE's/ADRs reviewed, notify immediately if any, discontinuing new meds if any. Plan made after discussion and shared decision making.     Full health maintenance issues addressed as above as well as other medical concerns diagnoses as above at today's visit. Update Tdap with puncture wound as above. Counseled extensively. Follow-up with Junie Wrgiht. Recommended follow-up Dr. Regina Palacios up now the symptoms persist or worsen. Abstain from any drug use. Healthy Mediterranean diet and appropriate exercise. Proper hydration and salt. He is going to follow-up in 1 month for Bexsero No. 2 and they simply want to revisit issues then, sooner symptoms persist or worsen anyway        As long as symptoms steadily improve/resolve, and medical conditions follow the expected course, FU as below, sooner PRN. Return in about 1 month (around 8/6/2022) for fu near syncope, bexsero #2. FOR WELL CK, SOONER PRN              Educational materials and/or home exercises printed for patient's review and were included in patient instructions on his/her After Visit Summary and given to patient at the end of visit. After discussion, patient and/or guardian verbalizes understanding, agrees, feels comfortable with and wishes to proceed with above treatment plan. Call for any pending results, FU sooner if abnormal, as needed or if any current symptoms persist/worsen. Advised patient to call with any new medication issues, and read all Rx info from pharmacy to assure aware of all possible risks and side effects of medication before taking. Reviewed age and gender appropriate health screening exams and vaccinations. Advised patient regarding importance of keeping up with recommended health maintenance and to schedule as soon as possible if overdue, as this is important in assessing for undiagnosed pathology, especially cancer, as well as protecting against potentially harmful/life threatening disease. Patient and/or guardian verbalizes understanding and agrees with above counseling, assessment and plan. All questions answered.     Over 40 minutes  spent with the patient in reviewing records, reviewing with patient/family, counseling, ordering,  prescribing, completing h&p, etc., with over 50% of the time spent face to face counseling. Signs and symptoms to watch for discussed, serious signs and symptoms reviewed. ER if any. Seen by:     Dio Celeste MD    This note was created with the assistance of voice recognition software. Document was reviewed however may contain grammatical errors.

## 2022-08-05 PROBLEM — Z00.121 ENCOUNTER FOR ROUTINE CHILD HEALTH EXAMINATION WITH ABNORMAL FINDINGS: Status: RESOLVED | Noted: 2019-11-14 | Resolved: 2022-08-05

## 2023-12-01 ENCOUNTER — OFFICE VISIT (OUTPATIENT)
Dept: PRIMARY CARE CLINIC | Age: 23
End: 2023-12-01

## 2023-12-01 VITALS
OXYGEN SATURATION: 97 % | WEIGHT: 171 LBS | SYSTOLIC BLOOD PRESSURE: 122 MMHG | TEMPERATURE: 97.6 F | BODY MASS INDEX: 24.48 KG/M2 | HEART RATE: 57 BPM | DIASTOLIC BLOOD PRESSURE: 60 MMHG | HEIGHT: 70 IN

## 2023-12-01 DIAGNOSIS — F84.0 AUTISTIC SPECTRUM DISORDER: ICD-10-CM

## 2023-12-01 DIAGNOSIS — Z00.00 HEALTH MAINTENANCE EXAMINATION: Primary | ICD-10-CM

## 2023-12-01 DIAGNOSIS — Z23 ENCOUNTER FOR IMMUNIZATION: ICD-10-CM

## 2023-12-01 DIAGNOSIS — F90.9 ATTENTION DEFICIT HYPERACTIVITY DISORDER (ADHD), UNSPECIFIED ADHD TYPE: ICD-10-CM

## 2023-12-01 RX ORDER — METHYLPHENIDATE HYDROCHLORIDE 36 MG/1
TABLET ORAL
COMMUNITY
Start: 2023-11-06

## 2023-12-01 SDOH — ECONOMIC STABILITY: INCOME INSECURITY: HOW HARD IS IT FOR YOU TO PAY FOR THE VERY BASICS LIKE FOOD, HOUSING, MEDICAL CARE, AND HEATING?: NOT HARD AT ALL

## 2023-12-01 SDOH — ECONOMIC STABILITY: FOOD INSECURITY: WITHIN THE PAST 12 MONTHS, THE FOOD YOU BOUGHT JUST DIDN'T LAST AND YOU DIDN'T HAVE MONEY TO GET MORE.: NEVER TRUE

## 2023-12-01 SDOH — ECONOMIC STABILITY: HOUSING INSECURITY
IN THE LAST 12 MONTHS, WAS THERE A TIME WHEN YOU DID NOT HAVE A STEADY PLACE TO SLEEP OR SLEPT IN A SHELTER (INCLUDING NOW)?: NO

## 2023-12-01 SDOH — ECONOMIC STABILITY: FOOD INSECURITY: WITHIN THE PAST 12 MONTHS, YOU WORRIED THAT YOUR FOOD WOULD RUN OUT BEFORE YOU GOT MONEY TO BUY MORE.: NEVER TRUE

## 2023-12-01 ASSESSMENT — PATIENT HEALTH QUESTIONNAIRE - PHQ9
SUM OF ALL RESPONSES TO PHQ QUESTIONS 1-9: 0
2. FEELING DOWN, DEPRESSED OR HOPELESS: 0
SUM OF ALL RESPONSES TO PHQ QUESTIONS 1-9: 0
SUM OF ALL RESPONSES TO PHQ QUESTIONS 1-9: 0
SUM OF ALL RESPONSES TO PHQ9 QUESTIONS 1 & 2: 0
SUM OF ALL RESPONSES TO PHQ QUESTIONS 1-9: 0
1. LITTLE INTEREST OR PLEASURE IN DOING THINGS: 0

## 2024-08-19 ENCOUNTER — OFFICE VISIT (OUTPATIENT)
Dept: PRIMARY CARE CLINIC | Age: 24
End: 2024-08-19
Payer: COMMERCIAL

## 2024-08-19 VITALS
BODY MASS INDEX: 25.74 KG/M2 | WEIGHT: 179.8 LBS | DIASTOLIC BLOOD PRESSURE: 60 MMHG | OXYGEN SATURATION: 98 % | HEIGHT: 70 IN | HEART RATE: 84 BPM | SYSTOLIC BLOOD PRESSURE: 110 MMHG | TEMPERATURE: 97 F

## 2024-08-19 DIAGNOSIS — F84.0 AUTISTIC SPECTRUM DISORDER: ICD-10-CM

## 2024-08-19 DIAGNOSIS — Z00.00 HEALTH MAINTENANCE EXAMINATION: Primary | ICD-10-CM

## 2024-08-19 DIAGNOSIS — Z72.0 TOBACCO USE: ICD-10-CM

## 2024-08-19 PROCEDURE — 99395 PREV VISIT EST AGE 18-39: CPT | Performed by: FAMILY MEDICINE

## 2024-08-19 ASSESSMENT — PATIENT HEALTH QUESTIONNAIRE - PHQ9
1. LITTLE INTEREST OR PLEASURE IN DOING THINGS: NOT AT ALL
2. FEELING DOWN, DEPRESSED OR HOPELESS: NOT AT ALL
SUM OF ALL RESPONSES TO PHQ9 QUESTIONS 1 & 2: 0
SUM OF ALL RESPONSES TO PHQ QUESTIONS 1-9: 0

## 2024-08-19 NOTE — PROGRESS NOTES
abnormal, as needed or if any current symptoms persist/worsen.      Advised patient to call with any new medication issues, and read all Rx info from pharmacy to assure aware of all possible risks and side effects of medication before taking.     Reviewed age and gender appropriate health screening exams and vaccinations.  Advised patient regarding importance of keeping up with recommended health maintenance and to schedule as soon as possible if overdue, as this is important in assessing for undiagnosed pathology, especially cancer, as well as protecting against potentially harmful/life threatening disease.          Patient and/or guardian verbalizes understanding and agrees with above counseling, assessment and plan.     All questions answered.      Signs and symptoms to watch for discussed, serious signs and symptoms reviewed.  ER if any.             Seen by:     Lloyd Vora MD    Patients are advised to check with insurance company to ensure coverage and to fully understand benefits and cost prior to any testing. This note was created with the assistance of voice recognition software.  Document was reviewed however may contain grammatical errors.  This note or partial portions of this note may have been created using a copy forward or copy paste feature but these portions have been verified and re-edited for accuracy and any portions not in need of editing or reviews are not being used to generate any component necessary for billing purposes.  Some portions may be carried over for continuity purposes and to aid me with monitoring of past medical conditions and discussions.  Elements necessary for proper CPT code selection are based only on elements of the visit that are truly unique to this visit.

## 2024-12-30 LAB
ALBUMIN SERPL-MCNC: 4.5 G/DL (ref 3.5–5.2)
ALP SERPL-CCNC: 64 U/L (ref 40–129)
ALT SERPL-CCNC: 49 U/L (ref 0–40)
ANION GAP SERPL CALCULATED.3IONS-SCNC: 10 MMOL/L (ref 7–16)
AST SERPL-CCNC: 28 U/L (ref 0–39)
BASOPHILS # BLD: 0.02 K/UL (ref 0–0.2)
BASOPHILS NFR BLD: 0 % (ref 0–2)
BILIRUB SERPL-MCNC: 0.5 MG/DL (ref 0–1.2)
BUN SERPL-MCNC: 14 MG/DL (ref 6–20)
CALCIUM SERPL-MCNC: 9.4 MG/DL (ref 8.6–10.2)
CHLORIDE SERPL-SCNC: 102 MMOL/L (ref 98–107)
CHOLEST SERPL-MCNC: 185 MG/DL
CO2 SERPL-SCNC: 25 MMOL/L (ref 22–29)
CREAT SERPL-MCNC: 0.8 MG/DL (ref 0.7–1.2)
EOSINOPHIL # BLD: 0.08 K/UL (ref 0.05–0.5)
EOSINOPHILS RELATIVE PERCENT: 1 % (ref 0–6)
ERYTHROCYTE [DISTWIDTH] IN BLOOD BY AUTOMATED COUNT: 12.7 % (ref 11.5–15)
GFR, ESTIMATED: >90 ML/MIN/1.73M2
GLUCOSE SERPL-MCNC: 95 MG/DL (ref 74–99)
HCT VFR BLD AUTO: 44.6 % (ref 37–54)
HDLC SERPL-MCNC: 35 MG/DL
HGB BLD-MCNC: 15.4 G/DL (ref 12.5–16.5)
IMM GRANULOCYTES # BLD AUTO: 0.03 K/UL (ref 0–0.58)
IMM GRANULOCYTES NFR BLD: 1 % (ref 0–5)
LDLC SERPL CALC-MCNC: 129 MG/DL
LYMPHOCYTES NFR BLD: 1.87 K/UL (ref 1.5–4)
LYMPHOCYTES RELATIVE PERCENT: 34 % (ref 20–42)
MCH RBC QN AUTO: 30.8 PG (ref 26–35)
MCHC RBC AUTO-ENTMCNC: 34.5 G/DL (ref 32–34.5)
MCV RBC AUTO: 89.2 FL (ref 80–99.9)
MONOCYTES NFR BLD: 0.72 K/UL (ref 0.1–0.95)
MONOCYTES NFR BLD: 13 % (ref 2–12)
NEUTROPHILS NFR BLD: 51 % (ref 43–80)
NEUTS SEG NFR BLD: 2.84 K/UL (ref 1.8–7.3)
PLATELET # BLD AUTO: 177 K/UL (ref 130–450)
PMV BLD AUTO: 10.2 FL (ref 7–12)
POTASSIUM SERPL-SCNC: 4.2 MMOL/L (ref 3.5–5)
PROT SERPL-MCNC: 7.1 G/DL (ref 6.4–8.3)
RBC # BLD AUTO: 5 M/UL (ref 3.8–5.8)
SODIUM SERPL-SCNC: 137 MMOL/L (ref 132–146)
TRIGL SERPL-MCNC: 106 MG/DL
VLDLC SERPL CALC-MCNC: 21 MG/DL
WBC OTHER # BLD: 5.6 K/UL (ref 4.5–11.5)